# Patient Record
Sex: FEMALE | Race: WHITE | NOT HISPANIC OR LATINO
[De-identification: names, ages, dates, MRNs, and addresses within clinical notes are randomized per-mention and may not be internally consistent; named-entity substitution may affect disease eponyms.]

---

## 2021-06-03 PROBLEM — Z00.00 ENCOUNTER FOR PREVENTIVE HEALTH EXAMINATION: Status: ACTIVE | Noted: 2021-06-03

## 2021-06-11 ENCOUNTER — RESULT CHARGE (OUTPATIENT)
Age: 21
End: 2021-06-11

## 2021-06-11 ENCOUNTER — APPOINTMENT (OUTPATIENT)
Dept: UROGYNECOLOGY | Facility: CLINIC | Age: 21
End: 2021-06-11
Payer: COMMERCIAL

## 2021-06-11 DIAGNOSIS — Z78.9 OTHER SPECIFIED HEALTH STATUS: ICD-10-CM

## 2021-06-11 DIAGNOSIS — N39.0 URINARY TRACT INFECTION, SITE NOT SPECIFIED: ICD-10-CM

## 2021-06-11 DIAGNOSIS — Z83.3 FAMILY HISTORY OF DIABETES MELLITUS: ICD-10-CM

## 2021-06-11 DIAGNOSIS — Z80.3 FAMILY HISTORY OF MALIGNANT NEOPLASM OF BREAST: ICD-10-CM

## 2021-06-11 DIAGNOSIS — R35.0 FREQUENCY OF MICTURITION: ICD-10-CM

## 2021-06-11 LAB
BILIRUB UR QL STRIP: NEGATIVE
CLARITY UR: CLEAR
COLLECTION METHOD: NORMAL
GLUCOSE UR-MCNC: NEGATIVE
HCG UR QL: 0.2 EU/DL
HGB UR QL STRIP.AUTO: NORMAL
KETONES UR-MCNC: NEGATIVE
LEUKOCYTE ESTERASE UR QL STRIP: NEGATIVE
NITRITE UR QL STRIP: NEGATIVE
PH UR STRIP: 6
PROT UR STRIP-MCNC: NEGATIVE
SP GR UR STRIP: 1

## 2021-06-11 PROCEDURE — 51701 INSERT BLADDER CATHETER: CPT

## 2021-06-11 PROCEDURE — 99072 ADDL SUPL MATRL&STAF TM PHE: CPT

## 2021-06-11 PROCEDURE — 99204 OFFICE O/P NEW MOD 45 MIN: CPT | Mod: 25

## 2021-06-11 RX ORDER — NITROFURANTOIN (MONOHYDRATE/MACROCRYSTALS) 25; 75 MG/1; MG/1
100 CAPSULE ORAL
Qty: 90 | Refills: 0 | Status: ACTIVE | COMMUNITY
Start: 2021-06-11 | End: 1900-01-01

## 2021-06-11 NOTE — OB HISTORY
[Total Preg ___] : : [unfilled] [Definite ___ (Date)] : the last menstrual period was [unfilled] [Last Pap Smear ___] : date of last pap smear was on [unfilled] [Sexually Active] : sexually active [Abnormal Pap Smear] : normal pap smear [FreeTextEntry1] : on OCPs\par hx abnl pap in past

## 2021-06-11 NOTE — ASSESSMENT
[FreeTextEntry1] : Lo is a pleasant 19 yo G0, on OCPs for years, presents with 9 months of questionable recurrent UTIs, likely UTI symptoms however only 1 UCx returned positive she believes. On exam, her empty supine CST was neg and she did not have positive urethral hypermobility. Her straight-cath PVR volume was elevated at 400 ml, and the dip showed trace blood so it was sent for UA, UCx to eval further for MH; she had menses but this was a cath specimen. On pelvic exam, she had a positive bulbo reflex. There were no appreciable masses, cysts, or lesions. Pelvic floor muscle contraction strength was present and good. There was bilateral levator musculature banding however no tenderness noted. POPQ exam did not demonstrate clinically significant pelvic organ prolapse. Her mother was present during the discussion per her request, questions and exam was done privately with chaperone GONZALES Mccallum.\par \par We discusseed the diagonsis of UTI, recurrent UTIs, eval with imaging and cysto to r/o path such as mass or stone, and prevention strategies with low-dose abx daily or post-coitally. She has not proved recurrent UTIs yet, and will send in or come in to give UCx any times she suspects based on symptoms. She will try prevention nitrofurantoin post-coitally x 6 months then dc and reassess. If gets true UCx+ UTIs while on the prevention, then we will investigate furhter with imaging and cysto. She undrestood and agreed. Repeat PVR at next visit as well to check for accuracy of incopmlete emptying. We discussed finally the differential such as urinary tract stone, OAB, IC/PBS, or PFD. Pathophys discussed, consider based on how she does with UTI prevention. All ques answered. RTO 3 months or prn suspect UTI.

## 2021-06-11 NOTE — PROCEDURE
[Straight Catheterization] : insertion of a straight catheter [Urinary Tract Infection] : a urinary tract infection [Urinary Frequency] : urinary frequency [Patient] : the patient [___ Fr Straight Tip] : a [unfilled] in [place] straight tip catheter [None] : there were no complications with the catheter insertion [Clear] : clear [Culture] : culture [Urinalysis] : urinalysis [No Complications] : no complications [Tolerated Well] : the patient tolerated the procedure well [Post procedure instructions and information given] : Post procedure instructions and information were given and reviewed with patient. [1] : 1 [FreeTextEntry1] : cathed to obtain pvr and uncontam speicmen

## 2021-06-11 NOTE — PHYSICAL EXAM
[Chaperone Present] : A chaperone was present in the examining room during all aspects of the physical examination [No Acute Distress] : in no acute distress [Oriented x3] : oriented to person, place, and time [No Edema] : ~T edema was not present [Symmetrical] : the neck was ~L symmetrical [None] : no CVA tenderness [Warm and Dry] : was warm and dry to touch [Normal Gait] : gait was normal [Labia Majora] : were normal [Labia Minora] : were normal [Normal Appearance] : general appearance was normal [No Bleeding] : there was no active vaginal bleeding [3] : 3 [Aa ____] : Aa [unfilled] [Ba ____] : Ba [unfilled] [C ____] : C [unfilled] [GH ____] : GH [unfilled] [PB ____] : PB [unfilled] [TVL ____] : TVL  [unfilled] [Ap ____] : Ap [unfilled] [Bp ____] : Bp [unfilled] [D ____] : D [unfilled] [] : 0 [Normal] : normal [Soft] :  the cervix was soft [Post Void Residual ____ml] : post void residual was [unfilled] ml [Exam Deferred] : was deferred [Cough] : no cough [Tenderness] : ~T no ~M abdominal tenderness observed [Distended] : not distended [Inguinal LAD] : no adenopathy was noted in the inguinal lymph nodes [FreeTextEntry3] : no urethral hypermobility, neg cst, no structural/anatomical abnormal findings, no cyst [de-identified] : no palpable mass or tenderness

## 2021-06-11 NOTE — HISTORY OF PRESENT ILLNESS
[FreeTextEntry1] : 1 UTI years ago after onset of sexual activity in [employer]. No UTIs again until 10/2020 - new partner at this time, new boyfriend she has been sexually active with monogomously. UTI symptoms of urinary freq and dysuria in 10/20, 12/20, 1/21, and 4/21, however reports that only the 10/20 UCx was pos, the rest returned neg. No gross hematuria. No leakage of urine. No flank pain. NO childhood urinary issues. No incomplete bladder emptying. No abuse or violence. No stressors or change in dietary habits or medical problems, medications. Normal bowel movements without straining constipation or clenching. No dysupareunia. No lubrication, spermidice, or toys/devices used during sexual activity. Unsure if the UTI-like symptoms have revolved around intercourse or not, but likely. Was given abx 3 out of the 4 times and symptoms resolved within a week or so. SH never a tobacco smoker. College student at [employer], living home and schooling remotely now. \par \par Reports:\par 12/9/20 10-50 K staph sapro UTI\par 12/26/21 UA no RBCs\par 4/16/21 UCx neg

## 2021-06-14 LAB
APPEARANCE: CLEAR
BACTERIA UR CULT: NORMAL
BACTERIA: NEGATIVE
BILIRUBIN URINE: NEGATIVE
BLOOD URINE: NEGATIVE
COLOR: COLORLESS
GLUCOSE QUALITATIVE U: NEGATIVE
HYALINE CASTS: 0 /LPF
KETONES URINE: NEGATIVE
LEUKOCYTE ESTERASE URINE: NEGATIVE
MICROSCOPIC-UA: NORMAL
NITRITE URINE: NEGATIVE
PH URINE: 6.5
PROTEIN URINE: NEGATIVE
RED BLOOD CELLS URINE: 6 /HPF
SPECIFIC GRAVITY URINE: 1
SQUAMOUS EPITHELIAL CELLS: 1 /HPF
UROBILINOGEN URINE: NORMAL
WHITE BLOOD CELLS URINE: 1 /HPF

## 2022-07-19 ENCOUNTER — NON-APPOINTMENT (OUTPATIENT)
Age: 22
End: 2022-07-19